# Patient Record
Sex: FEMALE | Race: WHITE | Employment: FULL TIME | URBAN - METROPOLITAN AREA
[De-identification: names, ages, dates, MRNs, and addresses within clinical notes are randomized per-mention and may not be internally consistent; named-entity substitution may affect disease eponyms.]

---

## 2018-12-11 ENCOUNTER — HOSPITAL ENCOUNTER (OUTPATIENT)
Dept: GENERAL RADIOLOGY | Age: 28
Discharge: HOME | End: 2018-12-11
Payer: COMMERCIAL

## 2018-12-11 DIAGNOSIS — R52 PAIN: ICD-10-CM

## 2018-12-11 PROCEDURE — 73502 X-RAY EXAM HIP UNI 2-3 VIEWS: CPT

## 2018-12-11 PROCEDURE — 72100 X-RAY EXAM L-S SPINE 2/3 VWS: CPT

## 2018-12-11 PROCEDURE — 72202 X-RAY EXAM SI JOINTS 3/> VWS: CPT

## 2021-10-15 ENCOUNTER — TRANSCRIBE ORDER (OUTPATIENT)
Dept: SCHEDULING | Age: 31
End: 2021-10-15

## 2021-10-15 DIAGNOSIS — M54.16 LUMBAR RADICULOPATHY: Primary | ICD-10-CM

## 2021-10-25 ENCOUNTER — HOSPITAL ENCOUNTER (OUTPATIENT)
Dept: MRI IMAGING | Age: 31
Discharge: HOME | End: 2021-10-25

## 2021-10-25 DIAGNOSIS — M54.16 LUMBAR RADICULOPATHY: ICD-10-CM

## 2021-10-28 ENCOUNTER — HOSPITAL ENCOUNTER (OUTPATIENT)
Dept: MRI IMAGING | Age: 31
Discharge: HOME | End: 2021-10-28
Payer: COMMERCIAL

## 2021-10-28 PROCEDURE — 72148 MRI LUMBAR SPINE W/O DYE: CPT | Performed by: INTERNAL MEDICINE

## 2022-10-18 LAB
CHLAMYDIA, EXTERNAL: NORMAL
HBSAG, EXTERNAL: NORMAL
HIV, EXTERNAL: NON REACTIVE
N. GONORRHEA, EXTERNAL: NORMAL
RUBELLA, EXTERNAL: NORMAL
TYPE, ABO & RH, EXTERNAL: NORMAL

## 2023-02-08 LAB — T. PALLIDUM, EXTERNAL: NON REACTIVE

## 2023-04-06 LAB — GRBS, EXTERNAL: NORMAL

## 2023-04-21 ENCOUNTER — HOSPITAL ENCOUNTER (INPATIENT)
Age: 33
LOS: 3 days | Discharge: HOME | End: 2023-04-24
Attending: OBSTETRICS & GYNECOLOGY | Admitting: OBSTETRICS & GYNECOLOGY
Payer: COMMERCIAL

## 2023-04-21 DIAGNOSIS — Z98.891 S/P CESAREAN SECTION: Primary | ICD-10-CM

## 2023-04-21 PROBLEM — O14.90 PREECLAMPSIA: Status: ACTIVE | Noted: 2023-04-21

## 2023-04-21 LAB
ABO + RH BLD: NORMAL
ALBUMIN SERPL-MCNC: 2.6 G/DL (ref 3.5–5)
ALBUMIN/GLOB SERPL: 0.5 (ref 1.1–2.2)
ALP SERPL-CCNC: 295 U/L (ref 45–117)
ALT SERPL-CCNC: 25 U/L (ref 12–78)
AMPHET UR QL SCN: NEGATIVE
ANION GAP SERPL CALC-SCNC: 4 MMOL/L (ref 5–15)
AST SERPL-CCNC: 24 U/L (ref 15–37)
BARBITURATES UR QL SCN: NEGATIVE
BASOPHILS # BLD: 0.1 K/UL (ref 0–0.1)
BASOPHILS NFR BLD: 1 % (ref 0–1)
BENZODIAZ UR QL: NEGATIVE
BILIRUB SERPL-MCNC: 0.5 MG/DL (ref 0.2–1)
BLOOD GROUP ANTIBODIES SERPL: NORMAL
BUN SERPL-MCNC: 15 MG/DL (ref 6–20)
BUN/CREAT SERPL: 23 (ref 12–20)
CALCIUM SERPL-MCNC: 9 MG/DL (ref 8.5–10.1)
CANNABINOIDS UR QL SCN: NEGATIVE
CHLORIDE SERPL-SCNC: 109 MMOL/L (ref 97–108)
CO2 SERPL-SCNC: 23 MMOL/L (ref 21–32)
COCAINE UR QL SCN: NEGATIVE
CREAT SERPL-MCNC: 0.65 MG/DL (ref 0.55–1.02)
DIFFERENTIAL METHOD BLD: ABNORMAL
DRUG SCRN COMMENT,DRGCM: NORMAL
EOSINOPHIL # BLD: 0.1 K/UL (ref 0–0.4)
EOSINOPHIL NFR BLD: 1 % (ref 0–7)
ERYTHROCYTE [DISTWIDTH] IN BLOOD BY AUTOMATED COUNT: 15.3 % (ref 11.5–14.5)
GLOBULIN SER CALC-MCNC: 4.8 G/DL (ref 2–4)
GLUCOSE SERPL-MCNC: 115 MG/DL (ref 65–100)
HCT VFR BLD AUTO: 34.6 % (ref 35–47)
HGB BLD-MCNC: 11.1 G/DL (ref 11.5–16)
IMM GRANULOCYTES # BLD AUTO: 0.1 K/UL (ref 0–0.04)
IMM GRANULOCYTES NFR BLD AUTO: 1 % (ref 0–0.5)
LYMPHOCYTES # BLD: 2.3 K/UL (ref 0.8–3.5)
LYMPHOCYTES NFR BLD: 22 % (ref 12–49)
MCH RBC QN AUTO: 27.7 PG (ref 26–34)
MCHC RBC AUTO-ENTMCNC: 32.1 G/DL (ref 30–36.5)
MCV RBC AUTO: 86.3 FL (ref 80–99)
METHADONE UR QL: NEGATIVE
MONOCYTES # BLD: 0.5 K/UL (ref 0–1)
MONOCYTES NFR BLD: 5 % (ref 5–13)
NEUTS SEG # BLD: 7.7 K/UL (ref 1.8–8)
NEUTS SEG NFR BLD: 70 % (ref 32–75)
NRBC # BLD: 0 K/UL (ref 0–0.01)
NRBC BLD-RTO: 0 PER 100 WBC
OPIATES UR QL: NEGATIVE
PCP UR QL: NEGATIVE
PLATELET # BLD AUTO: 258 K/UL (ref 150–400)
PMV BLD AUTO: 10.4 FL (ref 8.9–12.9)
POTASSIUM SERPL-SCNC: 3.7 MMOL/L (ref 3.5–5.1)
PROT SERPL-MCNC: 7.4 G/DL (ref 6.4–8.2)
RBC # BLD AUTO: 4.01 M/UL (ref 3.8–5.2)
SODIUM SERPL-SCNC: 136 MMOL/L (ref 136–145)
SPECIMEN EXP DATE BLD: NORMAL
WBC # BLD AUTO: 10.8 K/UL (ref 3.6–11)

## 2023-04-21 PROCEDURE — 74011250637 HC RX REV CODE- 250/637: Performed by: OBSTETRICS & GYNECOLOGY

## 2023-04-21 PROCEDURE — 80053 COMPREHEN METABOLIC PANEL: CPT

## 2023-04-21 PROCEDURE — 36415 COLL VENOUS BLD VENIPUNCTURE: CPT

## 2023-04-21 PROCEDURE — 65410000002 HC RM PRIVATE OB

## 2023-04-21 PROCEDURE — 86900 BLOOD TYPING SEROLOGIC ABO: CPT

## 2023-04-21 PROCEDURE — 74011250636 HC RX REV CODE- 250/636: Performed by: OBSTETRICS & GYNECOLOGY

## 2023-04-21 PROCEDURE — 80307 DRUG TEST PRSMV CHEM ANLYZR: CPT

## 2023-04-21 PROCEDURE — 85025 COMPLETE CBC W/AUTO DIFF WBC: CPT

## 2023-04-21 RX ORDER — SODIUM CHLORIDE 0.9 % (FLUSH) 0.9 %
5-40 SYRINGE (ML) INJECTION EVERY 8 HOURS
Status: DISCONTINUED | OUTPATIENT
Start: 2023-04-21 | End: 2023-04-22

## 2023-04-21 RX ORDER — ZOLPIDEM TARTRATE 5 MG/1
5 TABLET ORAL
Status: DISCONTINUED | OUTPATIENT
Start: 2023-04-21 | End: 2023-04-22

## 2023-04-21 RX ORDER — DIPHENHYDRAMINE HCL 25 MG
25 CAPSULE ORAL
Status: DISCONTINUED | OUTPATIENT
Start: 2023-04-21 | End: 2023-04-24 | Stop reason: HOSPADM

## 2023-04-21 RX ORDER — ONDANSETRON 2 MG/ML
4 INJECTION INTRAMUSCULAR; INTRAVENOUS
Status: DISCONTINUED | OUTPATIENT
Start: 2023-04-21 | End: 2023-04-22 | Stop reason: HOSPADM

## 2023-04-21 RX ORDER — NALOXONE HYDROCHLORIDE 0.4 MG/ML
0.4 INJECTION, SOLUTION INTRAMUSCULAR; INTRAVENOUS; SUBCUTANEOUS AS NEEDED
Status: DISCONTINUED | OUTPATIENT
Start: 2023-04-21 | End: 2023-04-22 | Stop reason: HOSPADM

## 2023-04-21 RX ORDER — OXYTOCIN/RINGER'S LACTATE 30/500 ML
87.3 PLASTIC BAG, INJECTION (ML) INTRAVENOUS AS NEEDED
Status: COMPLETED | OUTPATIENT
Start: 2023-04-21 | End: 2023-04-22

## 2023-04-21 RX ORDER — FENTANYL CITRATE 50 UG/ML
25 INJECTION, SOLUTION INTRAMUSCULAR; INTRAVENOUS
Status: DISCONTINUED | OUTPATIENT
Start: 2023-04-21 | End: 2023-04-22

## 2023-04-21 RX ORDER — AMOXICILLIN 875 MG/1
875 TABLET, FILM COATED ORAL 2 TIMES DAILY
COMMUNITY
End: 2023-04-24

## 2023-04-21 RX ORDER — AZELASTINE HCL 205.5 UG/1
1 SPRAY NASAL 2 TIMES DAILY
COMMUNITY

## 2023-04-21 RX ORDER — SODIUM CHLORIDE 0.9 % (FLUSH) 0.9 %
5-40 SYRINGE (ML) INJECTION AS NEEDED
Status: DISCONTINUED | OUTPATIENT
Start: 2023-04-21 | End: 2023-04-22

## 2023-04-21 RX ORDER — SODIUM CHLORIDE, SODIUM LACTATE, POTASSIUM CHLORIDE, CALCIUM CHLORIDE 600; 310; 30; 20 MG/100ML; MG/100ML; MG/100ML; MG/100ML
125 INJECTION, SOLUTION INTRAVENOUS CONTINUOUS
Status: DISCONTINUED | OUTPATIENT
Start: 2023-04-21 | End: 2023-04-22

## 2023-04-21 RX ORDER — OXYTOCIN/RINGER'S LACTATE 30/500 ML
10 PLASTIC BAG, INJECTION (ML) INTRAVENOUS AS NEEDED
Status: DISCONTINUED | OUTPATIENT
Start: 2023-04-21 | End: 2023-04-24 | Stop reason: HOSPADM

## 2023-04-21 RX ORDER — MAGNESIUM 250 MG
TABLET ORAL
COMMUNITY

## 2023-04-21 RX ADMIN — FENTANYL CITRATE 25 MCG: 50 INJECTION, SOLUTION INTRAMUSCULAR; INTRAVENOUS at 23:28

## 2023-04-21 RX ADMIN — Medication 25 MCG: at 20:24

## 2023-04-21 RX ADMIN — Medication 25 MCG: at 17:17

## 2023-04-21 RX ADMIN — SODIUM CHLORIDE, POTASSIUM CHLORIDE, SODIUM LACTATE AND CALCIUM CHLORIDE 999 ML/HR: 600; 310; 30; 20 INJECTION, SOLUTION INTRAVENOUS at 23:27

## 2023-04-21 RX ADMIN — SODIUM CHLORIDE, POTASSIUM CHLORIDE, SODIUM LACTATE AND CALCIUM CHLORIDE 125 ML/HR: 600; 310; 30; 20 INJECTION, SOLUTION INTRAVENOUS at 23:56

## 2023-04-21 RX ADMIN — Medication 25 MCG: at 14:14

## 2023-04-22 ENCOUNTER — ANESTHESIA (OUTPATIENT)
Dept: LABOR AND DELIVERY | Age: 33
End: 2023-04-22
Payer: COMMERCIAL

## 2023-04-22 ENCOUNTER — ANESTHESIA EVENT (OUTPATIENT)
Dept: LABOR AND DELIVERY | Age: 33
End: 2023-04-22
Payer: COMMERCIAL

## 2023-04-22 PROCEDURE — 74011000250 HC RX REV CODE- 250: Performed by: OBSTETRICS & GYNECOLOGY

## 2023-04-22 PROCEDURE — 74011000258 HC RX REV CODE- 258: Performed by: OBSTETRICS & GYNECOLOGY

## 2023-04-22 PROCEDURE — 65410000002 HC RM PRIVATE OB

## 2023-04-22 PROCEDURE — 76060000078 HC EPIDURAL ANESTHESIA

## 2023-04-22 PROCEDURE — 74011000250 HC RX REV CODE- 250: Performed by: STUDENT IN AN ORGANIZED HEALTH CARE EDUCATION/TRAINING PROGRAM

## 2023-04-22 PROCEDURE — 77010026065 HC OXYGEN MINIMUM MEDICAL AIR

## 2023-04-22 PROCEDURE — 74011000250 HC RX REV CODE- 250

## 2023-04-22 PROCEDURE — 76010000391 HC C SECN FIRST 1 HR: Performed by: OBSTETRICS & GYNECOLOGY

## 2023-04-22 PROCEDURE — 76060000032 HC ANESTHESIA 0.5 TO 1 HR: Performed by: OBSTETRICS & GYNECOLOGY

## 2023-04-22 PROCEDURE — 75410000002 HC LABOR FEE PER 1 HR

## 2023-04-22 PROCEDURE — 74011250637 HC RX REV CODE- 250/637: Performed by: OBSTETRICS & GYNECOLOGY

## 2023-04-22 PROCEDURE — 74011000250 HC RX REV CODE- 250: Performed by: NURSE ANESTHETIST, CERTIFIED REGISTERED

## 2023-04-22 PROCEDURE — 4A1HXCZ MONITORING OF PRODUCTS OF CONCEPTION, CARDIAC RATE, EXTERNAL APPROACH: ICD-10-PCS | Performed by: OBSTETRICS & GYNECOLOGY

## 2023-04-22 PROCEDURE — 77030014125 HC TY EPDRL BBMI -B: Performed by: STUDENT IN AN ORGANIZED HEALTH CARE EDUCATION/TRAINING PROGRAM

## 2023-04-22 PROCEDURE — 74011250636 HC RX REV CODE- 250/636: Performed by: OBSTETRICS & GYNECOLOGY

## 2023-04-22 PROCEDURE — 76060000078 HC EPIDURAL ANESTHESIA: Performed by: OBSTETRICS & GYNECOLOGY

## 2023-04-22 PROCEDURE — 75410000003 HC RECOV DEL/VAG/CSECN EA 0.5 HR

## 2023-04-22 PROCEDURE — 74011250636 HC RX REV CODE- 250/636: Performed by: NURSE ANESTHETIST, CERTIFIED REGISTERED

## 2023-04-22 RX ORDER — LIDOCAINE HCL/EPINEPHRINE/PF 2%-1:200K
VIAL (ML) INJECTION AS NEEDED
Status: DISCONTINUED | OUTPATIENT
Start: 2023-04-22 | End: 2023-04-22 | Stop reason: HOSPADM

## 2023-04-22 RX ORDER — FENTANYL/BUPIVACAINE/NS/PF 2-1250MCG
SYRINGE (ML) EPIDURAL
Status: COMPLETED
Start: 2023-04-22 | End: 2023-04-22

## 2023-04-22 RX ORDER — OXYTOCIN/RINGER'S LACTATE 30/500 ML
PLASTIC BAG, INJECTION (ML) INTRAVENOUS
Status: DISCONTINUED | OUTPATIENT
Start: 2023-04-22 | End: 2023-04-22 | Stop reason: HOSPADM

## 2023-04-22 RX ORDER — SODIUM CHLORIDE, SODIUM LACTATE, POTASSIUM CHLORIDE, CALCIUM CHLORIDE 600; 310; 30; 20 MG/100ML; MG/100ML; MG/100ML; MG/100ML
INJECTION, SOLUTION INTRAVENOUS
Status: DISCONTINUED | OUTPATIENT
Start: 2023-04-22 | End: 2023-04-22 | Stop reason: HOSPADM

## 2023-04-22 RX ORDER — OXYCODONE AND ACETAMINOPHEN 10; 325 MG/1; MG/1
1 TABLET ORAL
Status: DISCONTINUED | OUTPATIENT
Start: 2023-04-22 | End: 2023-04-23

## 2023-04-22 RX ORDER — BUPIVACAINE HYDROCHLORIDE AND EPINEPHRINE 2.5; 5 MG/ML; UG/ML
INJECTION, SOLUTION EPIDURAL; INFILTRATION; INTRACAUDAL; PERINEURAL
Status: COMPLETED | OUTPATIENT
Start: 2023-04-22 | End: 2023-04-22

## 2023-04-22 RX ORDER — ONDANSETRON 2 MG/ML
INJECTION INTRAMUSCULAR; INTRAVENOUS AS NEEDED
Status: DISCONTINUED | OUTPATIENT
Start: 2023-04-22 | End: 2023-04-22 | Stop reason: HOSPADM

## 2023-04-22 RX ORDER — SODIUM CHLORIDE 0.9 % (FLUSH) 0.9 %
5-40 SYRINGE (ML) INJECTION AS NEEDED
Status: DISCONTINUED | OUTPATIENT
Start: 2023-04-22 | End: 2023-04-24 | Stop reason: HOSPADM

## 2023-04-22 RX ORDER — MORPHINE SULFATE 0.5 MG/ML
INJECTION, SOLUTION EPIDURAL; INTRATHECAL; INTRAVENOUS AS NEEDED
Status: DISCONTINUED | OUTPATIENT
Start: 2023-04-22 | End: 2023-04-22 | Stop reason: HOSPADM

## 2023-04-22 RX ORDER — WATER FOR INJECTION,STERILE
VIAL (ML) INJECTION
Status: DISCONTINUED
Start: 2023-04-22 | End: 2023-04-22

## 2023-04-22 RX ORDER — PHENYLEPHRINE HCL IN 0.9% NACL 0.4MG/10ML
SYRINGE (ML) INTRAVENOUS AS NEEDED
Status: DISCONTINUED | OUTPATIENT
Start: 2023-04-22 | End: 2023-04-22 | Stop reason: HOSPADM

## 2023-04-22 RX ORDER — SIMETHICONE 80 MG
80 TABLET,CHEWABLE ORAL AS NEEDED
Status: DISCONTINUED | OUTPATIENT
Start: 2023-04-22 | End: 2023-04-24 | Stop reason: HOSPADM

## 2023-04-22 RX ORDER — FENTANYL CITRATE 50 UG/ML
INJECTION, SOLUTION INTRAMUSCULAR; INTRAVENOUS AS NEEDED
Status: DISCONTINUED | OUTPATIENT
Start: 2023-04-22 | End: 2023-04-22 | Stop reason: HOSPADM

## 2023-04-22 RX ORDER — FENTANYL/BUPIVACAINE/NS/PF 2-1250MCG
1-16 SYRINGE (ML) EPIDURAL CONTINUOUS
Status: DISCONTINUED | OUTPATIENT
Start: 2023-04-22 | End: 2023-04-22

## 2023-04-22 RX ORDER — IBUPROFEN 400 MG/1
800 TABLET ORAL EVERY 8 HOURS
Status: DISCONTINUED | OUTPATIENT
Start: 2023-04-22 | End: 2023-04-24 | Stop reason: HOSPADM

## 2023-04-22 RX ORDER — SODIUM CHLORIDE, SODIUM LACTATE, POTASSIUM CHLORIDE, CALCIUM CHLORIDE 600; 310; 30; 20 MG/100ML; MG/100ML; MG/100ML; MG/100ML
125 INJECTION, SOLUTION INTRAVENOUS CONTINUOUS
Status: DISPENSED | OUTPATIENT
Start: 2023-04-22 | End: 2023-04-23

## 2023-04-22 RX ORDER — SODIUM CHLORIDE 0.9 % (FLUSH) 0.9 %
5-40 SYRINGE (ML) INJECTION EVERY 8 HOURS
Status: DISCONTINUED | OUTPATIENT
Start: 2023-04-22 | End: 2023-04-24

## 2023-04-22 RX ORDER — EPHEDRINE SULFATE/0.9% NACL/PF 50 MG/5 ML
10 SYRINGE (ML) INTRAVENOUS
Status: DISCONTINUED | OUTPATIENT
Start: 2023-04-22 | End: 2023-04-22

## 2023-04-22 RX ORDER — NALOXONE HYDROCHLORIDE 0.4 MG/ML
0.4 INJECTION, SOLUTION INTRAMUSCULAR; INTRAVENOUS; SUBCUTANEOUS AS NEEDED
Status: DISCONTINUED | OUTPATIENT
Start: 2023-04-22 | End: 2023-04-24 | Stop reason: HOSPADM

## 2023-04-22 RX ORDER — CEFAZOLIN SODIUM 1 G/3ML
INJECTION, POWDER, FOR SOLUTION INTRAMUSCULAR; INTRAVENOUS
Status: DISCONTINUED
Start: 2023-04-22 | End: 2023-04-22

## 2023-04-22 RX ORDER — EPHEDRINE SULFATE/0.9% NACL/PF 50 MG/5 ML
SYRINGE (ML) INTRAVENOUS AS NEEDED
Status: DISCONTINUED | OUTPATIENT
Start: 2023-04-22 | End: 2023-04-22 | Stop reason: HOSPADM

## 2023-04-22 RX ORDER — OXYTOCIN/RINGER'S LACTATE 30/500 ML
0-6 PLASTIC BAG, INJECTION (ML) INTRAVENOUS
Status: DISCONTINUED | OUTPATIENT
Start: 2023-04-22 | End: 2023-04-22

## 2023-04-22 RX ADMIN — Medication 80 MCG: at 17:20

## 2023-04-22 RX ADMIN — SODIUM CHLORIDE, POTASSIUM CHLORIDE, SODIUM LACTATE AND CALCIUM CHLORIDE: 600; 310; 30; 20 INJECTION, SOLUTION INTRAVENOUS at 16:55

## 2023-04-22 RX ADMIN — SODIUM CHLORIDE, POTASSIUM CHLORIDE, SODIUM LACTATE AND CALCIUM CHLORIDE 125 ML/HR: 600; 310; 30; 20 INJECTION, SOLUTION INTRAVENOUS at 10:33

## 2023-04-22 RX ADMIN — FENTANYL CITRATE 100 MCG: 50 INJECTION, SOLUTION INTRAMUSCULAR; INTRAVENOUS at 17:01

## 2023-04-22 RX ADMIN — SODIUM CHLORIDE, POTASSIUM CHLORIDE, SODIUM LACTATE AND CALCIUM CHLORIDE: 600; 310; 30; 20 INJECTION, SOLUTION INTRAVENOUS at 17:45

## 2023-04-22 RX ADMIN — Medication 12 ML/HR: at 14:01

## 2023-04-22 RX ADMIN — ONDANSETRON HYDROCHLORIDE 4 MG: 2 INJECTION, SOLUTION INTRAMUSCULAR; INTRAVENOUS at 17:00

## 2023-04-22 RX ADMIN — Medication 10 MG: at 17:00

## 2023-04-22 RX ADMIN — Medication 12 ML/HR: at 07:03

## 2023-04-22 RX ADMIN — Medication 3 MG: at 17:27

## 2023-04-22 RX ADMIN — SODIUM CHLORIDE 2.5 MILLION UNITS: 9 INJECTION, SOLUTION INTRAVENOUS at 14:53

## 2023-04-22 RX ADMIN — WATER 2 G: 1 INJECTION INTRAMUSCULAR; INTRAVENOUS; SUBCUTANEOUS at 16:41

## 2023-04-22 RX ADMIN — SODIUM CHLORIDE, POTASSIUM CHLORIDE, SODIUM LACTATE AND CALCIUM CHLORIDE 125 ML/HR: 600; 310; 30; 20 INJECTION, SOLUTION INTRAVENOUS at 17:55

## 2023-04-22 RX ADMIN — LIDOCAINE HYDROCHLORIDE,EPINEPHRINE BITARTRATE 5 ML: 20; .005 INJECTION, SOLUTION EPIDURAL; INFILTRATION; INTRACAUDAL; PERINEURAL at 16:53

## 2023-04-22 RX ADMIN — SODIUM CHLORIDE, POTASSIUM CHLORIDE, SODIUM LACTATE AND CALCIUM CHLORIDE 125 ML/HR: 600; 310; 30; 20 INJECTION, SOLUTION INTRAVENOUS at 02:53

## 2023-04-22 RX ADMIN — LIDOCAINE HYDROCHLORIDE,EPINEPHRINE BITARTRATE 5 ML: 20; .005 INJECTION, SOLUTION EPIDURAL; INFILTRATION; INTRACAUDAL; PERINEURAL at 16:57

## 2023-04-22 RX ADMIN — LIDOCAINE HYDROCHLORIDE,EPINEPHRINE BITARTRATE 5 ML: 20; .005 INJECTION, SOLUTION EPIDURAL; INFILTRATION; INTRACAUDAL; PERINEURAL at 16:50

## 2023-04-22 RX ADMIN — BUPIVACAINE HYDROCHLORIDE AND EPINEPHRINE 10 ML: 2.5; 5 INJECTION, SOLUTION EPIDURAL; INFILTRATION; INTRACAUDAL; PERINEURAL at 00:15

## 2023-04-22 RX ADMIN — Medication 12 ML/HR: at 00:36

## 2023-04-22 RX ADMIN — OXYTOCIN 87.3 MILLI-UNITS/MIN: 10 INJECTION INTRAVENOUS at 17:16

## 2023-04-22 RX ADMIN — Medication 10 MG: at 17:33

## 2023-04-22 RX ADMIN — AZITHROMYCIN MONOHYDRATE 500 MG: 500 INJECTION, POWDER, LYOPHILIZED, FOR SOLUTION INTRAVENOUS at 17:02

## 2023-04-22 RX ADMIN — OXYCODONE AND ACETAMINOPHEN 1 TABLET: 10; 325 TABLET ORAL at 20:21

## 2023-04-22 RX ADMIN — IBUPROFEN 800 MG: 400 TABLET, FILM COATED ORAL at 19:22

## 2023-04-22 RX ADMIN — SODIUM CHLORIDE 2.5 MILLION UNITS: 9 INJECTION, SOLUTION INTRAVENOUS at 11:26

## 2023-04-22 RX ADMIN — Medication 909 ML/HR: at 17:16

## 2023-04-22 RX ADMIN — Medication 2 MG: at 21:49

## 2023-04-22 RX ADMIN — SODIUM CHLORIDE 5 MILLION UNITS: 900 INJECTION INTRAVENOUS at 02:36

## 2023-04-22 RX ADMIN — Medication 80 MCG: at 17:25

## 2023-04-22 RX ADMIN — OXYTOCIN 1 MILLI-UNITS/MIN: 10 INJECTION INTRAVENOUS at 08:15

## 2023-04-22 RX ADMIN — SODIUM CHLORIDE 2.5 MILLION UNITS: 9 INJECTION, SOLUTION INTRAVENOUS at 06:39

## 2023-04-22 NOTE — ANESTHESIA PROCEDURE NOTES
Epidural Block    Patient location during procedure: OB  Start time: 4/22/2023 12:05 AM  End time: 4/22/2023 12:20 AM  Reason for block: labor epidural  Staffing  Performed: attending   Anesthesiologist: Kelly Schultz MD  Preanesthetic Checklist  Completed: patient identified, IV checked, site marked, risks and benefits discussed, surgical consent, monitors and equipment checked, pre-op evaluation, timeout performed and fire risk safety assessment completed and verbalized  Block Placement  Patient position: sitting  Prep: DuraPrep  Sterility prep: cap, drape, gloves, hand and mask  Sedation level: no sedation  Patient monitoring: continuous pulse oximetry, ETCO2, frequent blood pressure checks and heart rate  Approach: midline  Location: lumbar  Lumbar location: L3-L4  Epidural  Loss of resistance technique: saline  Guidance: landmark technique  Needle  Needle type: Tuohy   Needle gauge: 18 G  Needle length: 9 cm  Needle insertion depth: 5 cm  Catheter type: end hole  Catheter size: 19 G  Catheter at skin depth: 10 cm  Catheter securement method: surgical tape and clear occlusive dressing  Test dose: negative  Medications Administered  bupivacaine 0.25% -EPINEPHrine 1:200,000 (SENSORCAINE) mg Epidural - Epidural   10 mL - 4/22/2023 12:15:00 AM  Assessment  Number of attempts: 1  Procedure assessment: patient tolerated procedure well with no immediate complications

## 2023-04-22 NOTE — ANESTHESIA PREPROCEDURE EVALUATION
Relevant Problems   No relevant active problems       Anesthetic History   No history of anesthetic complications            Review of Systems / Medical History  Patient summary reviewed, nursing notes reviewed and pertinent labs reviewed    Pulmonary  Within defined limits                 Neuro/Psych   Within defined limits           Cardiovascular  Within defined limits                     GI/Hepatic/Renal  Within defined limits              Endo/Other  Within defined limits           Other Findings   Comments: preeclampsia           Physical Exam    Airway  Mallampati: II  TM Distance: > 6 cm  Neck ROM: normal range of motion   Mouth opening: Normal     Cardiovascular    Rhythm: regular  Rate: normal         Dental  No notable dental hx       Pulmonary  Breath sounds clear to auscultation               Abdominal  GI exam deferred       Other Findings            Anesthetic Plan    ASA: 2  Anesthesia type: epidural          Induction: Intravenous  Anesthetic plan and risks discussed with: Patient

## 2023-04-22 NOTE — ANESTHESIA POSTPROCEDURE EVALUATION
Procedure(s):   SECTION. epidural    Anesthesia Post Evaluation      Multimodal analgesia: multimodal analgesia used between 6 hours prior to anesthesia start to PACU discharge  Patient location during evaluation: PACU  Patient participation: complete - patient participated  Level of consciousness: awake and alert  Pain management: adequate  Airway patency: patent  Anesthetic complications: no  Cardiovascular status: acceptable, hemodynamically stable and blood pressure returned to baseline  Respiratory status: acceptable and room air  Hydration status: euvolemic  Post anesthesia nausea and vomiting:  none  Final Post Anesthesia Temperature Assessment:  Normothermia (36.0-37.5 degrees C)      INITIAL Post-op Vital signs: No vitals data found for the desired time range.

## 2023-04-23 LAB
BASOPHILS # BLD: 0 K/UL (ref 0–0.1)
BASOPHILS NFR BLD: 0 % (ref 0–1)
DIFFERENTIAL METHOD BLD: ABNORMAL
EOSINOPHIL # BLD: 0 K/UL (ref 0–0.4)
EOSINOPHIL NFR BLD: 0 % (ref 0–7)
ERYTHROCYTE [DISTWIDTH] IN BLOOD BY AUTOMATED COUNT: 15.2 % (ref 11.5–14.5)
HCT VFR BLD AUTO: 30.2 % (ref 35–47)
HGB BLD-MCNC: 9.8 G/DL (ref 11.5–16)
IMM GRANULOCYTES # BLD AUTO: 0.1 K/UL (ref 0–0.04)
IMM GRANULOCYTES NFR BLD AUTO: 1 % (ref 0–0.5)
LYMPHOCYTES # BLD: 1.6 K/UL (ref 0.8–3.5)
LYMPHOCYTES NFR BLD: 11 % (ref 12–49)
MCH RBC QN AUTO: 28.1 PG (ref 26–34)
MCHC RBC AUTO-ENTMCNC: 32.5 G/DL (ref 30–36.5)
MCV RBC AUTO: 86.5 FL (ref 80–99)
MONOCYTES # BLD: 0.8 K/UL (ref 0–1)
MONOCYTES NFR BLD: 6 % (ref 5–13)
NEUTS SEG # BLD: 12.2 K/UL (ref 1.8–8)
NEUTS SEG NFR BLD: 82 % (ref 32–75)
NRBC # BLD: 0 K/UL (ref 0–0.01)
NRBC BLD-RTO: 0 PER 100 WBC
PLATELET # BLD AUTO: 197 K/UL (ref 150–400)
PMV BLD AUTO: 10.1 FL (ref 8.9–12.9)
RBC # BLD AUTO: 3.49 M/UL (ref 3.8–5.2)
WBC # BLD AUTO: 14.8 K/UL (ref 3.6–11)

## 2023-04-23 PROCEDURE — 36415 COLL VENOUS BLD VENIPUNCTURE: CPT

## 2023-04-23 PROCEDURE — 74011250636 HC RX REV CODE- 250/636: Performed by: OBSTETRICS & GYNECOLOGY

## 2023-04-23 PROCEDURE — 74011250637 HC RX REV CODE- 250/637: Performed by: OBSTETRICS & GYNECOLOGY

## 2023-04-23 PROCEDURE — 85025 COMPLETE CBC W/AUTO DIFF WBC: CPT

## 2023-04-23 PROCEDURE — 65410000002 HC RM PRIVATE OB

## 2023-04-23 RX ORDER — ACETAMINOPHEN 325 MG/1
650 TABLET ORAL
Status: DISCONTINUED | OUTPATIENT
Start: 2023-04-23 | End: 2023-04-24 | Stop reason: HOSPADM

## 2023-04-23 RX ORDER — DOCUSATE SODIUM 100 MG/1
100 CAPSULE, LIQUID FILLED ORAL 2 TIMES DAILY
Status: DISCONTINUED | OUTPATIENT
Start: 2023-04-23 | End: 2023-04-24 | Stop reason: HOSPADM

## 2023-04-23 RX ORDER — OXYCODONE HYDROCHLORIDE 5 MG/1
5-10 TABLET ORAL
Status: DISCONTINUED | OUTPATIENT
Start: 2023-04-23 | End: 2023-04-24 | Stop reason: HOSPADM

## 2023-04-23 RX ORDER — ONDANSETRON 2 MG/ML
4 INJECTION INTRAMUSCULAR; INTRAVENOUS
Status: DISCONTINUED | OUTPATIENT
Start: 2023-04-23 | End: 2023-04-24 | Stop reason: HOSPADM

## 2023-04-23 RX ADMIN — ONDANSETRON 4 MG: 2 INJECTION INTRAMUSCULAR; INTRAVENOUS at 01:13

## 2023-04-23 RX ADMIN — IBUPROFEN 800 MG: 400 TABLET, FILM COATED ORAL at 13:59

## 2023-04-23 RX ADMIN — DOCUSATE SODIUM 100 MG: 100 CAPSULE, LIQUID FILLED ORAL at 10:46

## 2023-04-23 RX ADMIN — IBUPROFEN 800 MG: 400 TABLET, FILM COATED ORAL at 23:05

## 2023-04-23 RX ADMIN — ACETAMINOPHEN 650 MG: 325 TABLET ORAL at 17:55

## 2023-04-23 RX ADMIN — IBUPROFEN 800 MG: 400 TABLET, FILM COATED ORAL at 05:54

## 2023-04-23 RX ADMIN — SODIUM CHLORIDE, POTASSIUM CHLORIDE, SODIUM LACTATE AND CALCIUM CHLORIDE 125 ML/HR: 600; 310; 30; 20 INJECTION, SOLUTION INTRAVENOUS at 01:16

## 2023-04-23 RX ADMIN — OXYCODONE AND ACETAMINOPHEN 1 TABLET: 10; 325 TABLET ORAL at 00:36

## 2023-04-23 RX ADMIN — ACETAMINOPHEN 650 MG: 325 TABLET ORAL at 10:48

## 2023-04-23 RX ADMIN — OXYCODONE AND ACETAMINOPHEN 1 TABLET: 10; 325 TABLET ORAL at 05:54

## 2023-04-23 RX ADMIN — ONDANSETRON 4 MG: 2 INJECTION INTRAMUSCULAR; INTRAVENOUS at 06:04

## 2023-04-23 RX ADMIN — DOCUSATE SODIUM 100 MG: 100 CAPSULE, LIQUID FILLED ORAL at 17:55

## 2023-04-24 VITALS
TEMPERATURE: 98.5 F | RESPIRATION RATE: 18 BRPM | DIASTOLIC BLOOD PRESSURE: 74 MMHG | WEIGHT: 178 LBS | BODY MASS INDEX: 29.66 KG/M2 | SYSTOLIC BLOOD PRESSURE: 115 MMHG | HEIGHT: 65 IN | OXYGEN SATURATION: 95 % | HEART RATE: 76 BPM

## 2023-04-24 PROCEDURE — 74011250637 HC RX REV CODE- 250/637: Performed by: OBSTETRICS & GYNECOLOGY

## 2023-04-24 RX ORDER — ACETAMINOPHEN 325 MG/1
650 TABLET ORAL
Qty: 30 TABLET | Refills: 0 | Status: SHIPPED | OUTPATIENT
Start: 2023-04-24

## 2023-04-24 RX ORDER — IBUPROFEN 800 MG/1
800 TABLET ORAL EVERY 8 HOURS
Qty: 30 TABLET | Refills: 0 | Status: SHIPPED | OUTPATIENT
Start: 2023-04-24

## 2023-04-24 RX ORDER — OXYCODONE HYDROCHLORIDE 5 MG/1
5 TABLET ORAL
Qty: 15 TABLET | Refills: 0 | Status: SHIPPED | OUTPATIENT
Start: 2023-04-24 | End: 2023-05-01

## 2023-04-24 RX ORDER — DOCUSATE SODIUM 100 MG/1
100 CAPSULE, LIQUID FILLED ORAL 2 TIMES DAILY
Qty: 60 CAPSULE | Refills: 2 | Status: SHIPPED | OUTPATIENT
Start: 2023-04-24 | End: 2023-07-23

## 2023-04-24 RX ADMIN — OXYCODONE 5 MG: 5 TABLET ORAL at 10:56

## 2023-04-24 RX ADMIN — OXYCODONE 5 MG: 5 TABLET ORAL at 06:41

## 2023-04-24 RX ADMIN — DOCUSATE SODIUM 100 MG: 100 CAPSULE, LIQUID FILLED ORAL at 08:23

## 2023-04-24 RX ADMIN — OXYCODONE 5 MG: 5 TABLET ORAL at 00:21

## 2023-04-24 RX ADMIN — IBUPROFEN 800 MG: 400 TABLET, FILM COATED ORAL at 06:17

## 2023-04-24 RX ADMIN — ACETAMINOPHEN 650 MG: 325 TABLET ORAL at 01:56

## 2023-04-24 NOTE — DISCHARGE SUMMARY
Obstetrical Discharge Summary     Name: Bree Dhaliwal MRN: [id number]  SSN: [id number]    YOB: 1990  Age: 26 y.o. Sex: female      Admit Date: 2023    Discharge Date: 2023     Admitting Physician: Hao Su MD     Attending Physician:  Yury Estrada*     Admission Diagnoses: Preeclampsia [O14.90]    Discharge Diagnoses:   Information for the patient's :  Ruiz Pac [[id number]]   Delivery of a 2.54 kg male infant via , Low Transverse on 2023 at 5:15 PM  by Gregory Gomez. Apgars were 8  and 9 . Additional Diagnoses:   Hospital Problems  Date Reviewed: 2023            Codes Class Noted POA    Preeclampsia ICD-10-CM: O14.90  ICD-9-CM: 642.40  2023 Unknown          Lab Results   Component Value Date/Time    Rubella, External Imm 10/18/2022 12:00 AM    GrBStrep, External pos 2023 12:00 AM       Hospital Course: 1LTCS for NRFS during IOL for pre-eclampsia without SF. Normal Bps. Routine PP course. Patient Instructions:   Current Discharge Medication List        START taking these medications    Details   acetaminophen (TYLENOL) 325 mg tablet Take 2 Tablets by mouth every four (4) hours as needed for Pain. Qty: 30 Tablet, Refills: 0      docusate sodium (COLACE) 100 mg capsule Take 1 Capsule by mouth two (2) times a day for 90 days. Qty: 60 Capsule, Refills: 2      ibuprofen (MOTRIN) 800 mg tablet Take 1 Tablet by mouth every eight (8) hours. Qty: 30 Tablet, Refills: 0      oxyCODONE IR (ROXICODONE) 5 mg immediate release tablet Take 1 Tablet by mouth every four (4) hours as needed for Pain for up to 7 days. Max Daily Amount: 30 mg.  Qty: 15 Tablet, Refills: 0    Associated Diagnoses: S/P  section           CONTINUE these medications which have NOT CHANGED    Details   PNV Comb #2-Iron-FA-Omega 3 29-1-400 mg cmpk Take  by mouth.       azelastine (ASTEPRO) 205.5 mcg (0.15 %) 1 Spray by Both Nostrils route two (2) times a day. Indications: seasonal runny nose      magnesium 250 mg tab Take  by mouth. STOP taking these medications       amoxicillin (AMOXIL) 875 mg tablet Comments:   Reason for Stopping:               Disposition at Discharge: Home or self care    Condition at Discharge: Stable    Reference my discharge instructions.     Follow-up Appointments   Procedures    FOLLOW UP VISIT Appointment in: 6 Weeks     Standing Status:   Standing     Number of Occurrences:   1     Order Specific Question:   Appointment in     Answer:   6 Weeks        Signed By:  Mary Donato MD     April 24, 2023

## 2023-04-24 NOTE — DISCHARGE INSTRUCTIONS
After Your Delivery (the Postpartum Period): Care Instructions  Overview     Congratulations on the birth of your baby. Like pregnancy, the  period can be a time of excitement, ritika, and exhaustion. You may look at your wondrous little baby and feel happy. You may also be overwhelmed by your new sleep hours and new responsibilities. At first, babies often sleep during the days and are awake at night. They do not have a pattern or routine. They may make sudden gasps, jerk themselves awake, or look like they have crossed eyes. These are all normal, and they may even make you smile. In these first weeks after delivery, try to take good care of yourself. It may take 4 to 6 weeks to feel like yourself again, and possibly longer if you had a  birth. You will likely feel very tired for several weeks. Your days will be full of ups and downs, but lots of ritika as well. Follow-up care is a key part of your treatment and safety. Be sure to make and go to all appointments, and call your doctor if you are having problems. It's also a good idea to know your test results and keep a list of the medicines you take. How can you care for yourself at home? Take care of your body after delivery  Use pads instead of tampons for the bloody flow that may last as long as 2 weeks. Ease cramps with ibuprofen (Advil, Motrin). Ease soreness of hemorrhoids and the area between your vagina and rectum with ice compresses or witch hazel pads. Ease constipation by drinking lots of fluid and eating high-fiber foods. Ask your doctor about over-the-counter stool softeners. Cleanse yourself with a gentle squeeze of warm water from a bottle instead of wiping with toilet paper. Take a sitz bath in warm water several times a day. Wear a good nursing bra. Ease sore and swollen breasts with warm, wet washcloths. If you aren't breastfeeding, use ice rather than heat for breast soreness.   Your period may not start for several months if you are breastfeeding. You may bleed more, and longer at first, than you did before you got pregnant. Wait until you are healed (about 4 to 6 weeks) before you have sex. Ask your doctor when it is okay for you to have sex. Try not to travel with your baby for 5 or 6 weeks. If you take a long car trip, make frequent stops to walk around and stretch. Avoid exhaustion  Rest every day. Try to nap when your baby naps. Ask another adult to be with you for a few days after delivery. Plan for  if you have other children. Stay flexible so you can eat at odd hours and sleep when you need to. Both you and your baby are making new schedules. Plan small trips to get out of the house. Change can make you feel less tired. Ask for help with housework, cooking, and shopping. Remind yourself that your job is to care for your baby. Know about help for postpartum depression  \"Baby blues\" are common for the first 1 to 2 weeks after birth. You may cry or feel sad or irritable for no reason. Rest whenever you can. Being tired makes it harder to handle your emotions. Go for walks with your baby. Talk to your partner, friends, and family about your feelings. If your symptoms last for more than a few weeks, or if you feel very depressed, ask your doctor for help. Postpartum depression can be treated. Support groups and counseling can help. Sometimes medicine can also help. Stay healthy  Eat healthy foods so you have more energy. If you breastfeed, avoid drugs. If you quit smoking during pregnancy, try to stay smoke-free. If you choose to have a drink now and then, have only one drink, and limit the number of occasions that you have a drink. Wait to breastfeed at least 2 hours after you have a drink to reduce the amount of alcohol the baby may get in the milk. Start daily exercise after 4 to 6 weeks, but rest when you feel tired. Learn exercises to tone your belly.  Try Kegel exercises to regain strength in your pelvic muscles. You can do these exercises while you stand or sit. (If doing these exercises causes pain, stop doing them and talk with your doctor.)  Squeeze your muscles as if you were trying not to pass gas. Or squeeze your muscles as if you were stopping the flow of urine. Your belly, legs, and buttocks shouldn't move. Hold the squeeze for 3 seconds, then relax for 5 to 10 seconds. Start with 3 seconds, then add 1 second each week until you are able to squeeze for 10 seconds. Repeat the exercise 10 times a session. Do 3 to 8 sessions a day. Find a class for you and your baby that has an exercise time. If you had a  birth, give yourself a bit more time before you exercise, and be careful. When should you call for help? Share this information with your partner, family, or a friend. They can help you watch for warning signs. Call 911  anytime you think you may need emergency care. For example, call if:    You have thoughts of harming yourself, your baby, or another person. You passed out (lost consciousness). You have chest pain, are short of breath, or cough up blood. You have a seizure. Call your doctor now or seek immediate medical care if:    You have signs of hemorrhage (too much bleeding), such as:  Heavy vaginal bleeding. This means that you are soaking through one or more pads in an hour. Or you pass blood clots bigger than an egg. Feeling dizzy or lightheaded, or you feel like you may faint. Feeling so tired or weak that you cannot do your usual activities. A fast or irregular heartbeat. New or worse belly pain. You have signs of infection, such as:  A fever. Vaginal discharge that smells bad. New or worse belly pain. You have symptoms of a blood clot in your leg (called a deep vein thrombosis), such as:  Pain in the calf, back of the knee, thigh, or groin. Redness and swelling in your leg or groin.      You have signs of preeclampsia, such as:  Sudden swelling of your face, hands, or feet. New vision problems (such as dimness, blurring, or seeing spots). A severe headache. Watch closely for changes in your health, and be sure to contact your doctor if:    Your vaginal bleeding isn't decreasing. You feel sad, anxious, or hopeless for more than a few days. You are having problems with your breasts or breastfeeding. Where can you learn more? Go to http://www.l[employer].com/  Enter A461 in the search box to learn more about \"After Your Delivery (the Postpartum Period): Care Instructions. \"  Current as of: November 9, 2022               Content Version: 13.6  © 0635-9497 [employer]. Care instructions adapted under license by [employer] (which disclaims liability or warranty for this information). If you have questions about a medical condition or this instruction, always ask your healthcare professional. [place] [address] any warranty or liability for your use of this information.

## 2023-04-24 NOTE — PROGRESS NOTES
Patient discharged. Discharge instructions and medications reviewed/given. Patient verbalizes understanding. All questions answered. No distress noted, patient stable. Signed copy of discharge instructions placed on paper chart. Patient confirmed will call to make appointment in 6 weeks with [phone] Sreekanth Rodriguez.

## 2023-04-24 NOTE — PROGRESS NOTES
Post-Operative  Day 2    [facility]     Assessment: Post-Op day 2, stable for nonreassuring FHTs p IOL for preeclampsia without severe features. Normal BP. Hgb 11.1 > 9.8. Plan:     - Routine post-operative care. - The risks and benefits of the circumcision  procedure and anesthesia including: bleeding, infection, variability of cosmetic results were discussed at length with the mother. She is aware that future repeat procedures may be necessary. She gives informed consent to proceed as noted and her questions are answered. - Postop hemoglobin stable. Plan to start Fe at discharge if pt anemic.  - Ambulate today. Information for the patient's :  Gisselle Tom [[id number]]   , Low Transverse   Patient doing well without significant complaint. Tolerating diet. Alonzo out. Ambulating. Vitals:  Visit Vitals  /71   Pulse 88   Temp 98.9 °F (37.2 °C)   Resp 17   Ht 5' 5\" (1.651 m)   Wt 80.7 kg (178 lb)   SpO2 97%   Breastfeeding Unknown   BMI 29.62 kg/m²     Temp (24hrs), Av.7 °F (37.1 °C), Min:98.3 °F (36.8 °C), Max:98.9 °F (37.2 °C)      Last 24hr Input/Output:    Intake/Output Summary (Last 24 hours) at 2023 0824  Last data filed at 2023 1716  Gross per 24 hour   Intake --   Output 950 ml   Net -950 ml            Exam:     Patient without distress. Fundus firm, nontender per nursing fundal checks. Incision bandaged, clean, dry, intact. Perineum with normal lochia noted per nursing assessment. Lower extremities are negative for pathological edema.     Labs:   Lab Results   Component Value Date/Time    WBC 14.8 (H) 2023 03:50 AM    WBC 10.8 2023 02:11 PM    HGB 9.8 (L) 2023 03:50 AM    HGB 11.1 (L) 2023 02:11 PM    HCT 30.2 (L) 2023 03:50 AM    HCT 34.6 (L) 2023 02:11 PM    PLATELET 602  03:50 AM    PLATELET 889  02:11 PM       No results found for this or any previous visit (from the past 24 hour(s)).

## 2023-04-24 NOTE — LACTATION NOTE
23 1200   Visit Information   Lactation Consult Visit Type IP Initial Consult   Visit Length 60 minutes   Reason for Visit Normal Barre Visit; Latch Problems;Education   Breast- Feeding Assessment   Breast-Feeding Experience No  Equipment: Medela PS; Measured for 21mm flanges; Educated on how to order 21mm flanges   Breast Assessment   Left Breast Medium   Left Nipple Short   Right Breast Medium   Right Nipple Short   Mother/Infant Observation   Infant Observation Frenulum checked  (Oral assessment WDL)     Reviewed the \"Your Guide to Breastfeeding\" booklet. Discussed the typical feeding characteristics in the 2nd DOL and signs of adequate intake. Baby has been supplemented with formula each feeding. Doing some breastfeeding and mother pumping. Reviewed the supply and demand concept of breast milk production and the importance of regular pumping when baby is being supplemented. Discussed a feeding plan to include offering the breast, supplementing with formula and EBM as mother's milk comes in. Follow with pumping for 15-20 minutes. Mother's questions were addressed.

## (undated) DEVICE — PENCIL ES L3M BTTN SWCH S STL HEX LOK BLDE ELECTRD HOLSTER

## (undated) DEVICE — SUTURE CHROMIC GUT SZ 1 L36IN ABSRB BRN L48MM CTX 1/2 CIR 905H

## (undated) DEVICE — PREP SKN CHLRAPRP APL 26ML STR --

## (undated) DEVICE — STERILE POLYISOPRENE POWDER-FREE SURGICAL GLOVES: Brand: PROTEXIS

## (undated) DEVICE — CLEANER ES TIP W2XL2IN ADH BK RADPQ FOR S STL ELECTRD

## (undated) DEVICE — LARGE, DISPOSABLE ALEXIS O C-SECTION PROTECTOR - RETRACTOR: Brand: ALEXIS ® O C-SECTION PROTECTOR - RETRACTOR

## (undated) DEVICE — SOLUTION IV 1000ML 0.9% SOD CHL

## (undated) DEVICE — STRAP,POSITIONING,KNEE/BODY,FOAM,4X60": Brand: MEDLINE

## (undated) DEVICE — SUT CHRMC 2-0 27IN SH BRN --

## (undated) DEVICE — BARRIER TISS ADH ABSRB 3X4IN -- GYNECARE INTERCEED

## (undated) DEVICE — STAPLER SKN INSORB 30 COUNT --

## (undated) DEVICE — 3000CC GUARDIAN II: Brand: GUARDIAN

## (undated) DEVICE — SUT CHRMC 2-0 27IN CT1 BRN --

## (undated) DEVICE — C-SECTION II-LF: Brand: MEDLINE INDUSTRIES, INC.

## (undated) DEVICE — REM POLYHESIVE ADULT PATIENT RETURN ELECTRODE: Brand: VALLEYLAB

## (undated) DEVICE — SPONGE COUNTING BAG: Brand: DEVON

## (undated) DEVICE — Device

## (undated) DEVICE — MEDI-VAC NON-CONDUCTIVE SUCTION TUBING: Brand: CARDINAL HEALTH

## (undated) DEVICE — SUTURE VCRL SZ 0 L36IN ABSRB VLT L40MM CT 1/2 CIR J358H

## (undated) DEVICE — SOLIDIFIER BTL 3000CC --

## (undated) DEVICE — SUTURE VCRL SZ 4-0 L27IN ABSRB UD L19MM PS-2 3/8 CIR PRIM J426H